# Patient Record
Sex: FEMALE | Race: WHITE | NOT HISPANIC OR LATINO | URBAN - METROPOLITAN AREA
[De-identification: names, ages, dates, MRNs, and addresses within clinical notes are randomized per-mention and may not be internally consistent; named-entity substitution may affect disease eponyms.]

---

## 2022-04-04 ENCOUNTER — OFFICE VISIT (OUTPATIENT)
Dept: URGENT CARE | Facility: CLINIC | Age: 22
End: 2022-04-04
Payer: COMMERCIAL

## 2022-04-04 VITALS
TEMPERATURE: 98 F | HEART RATE: 106 BPM | WEIGHT: 133.2 LBS | DIASTOLIC BLOOD PRESSURE: 70 MMHG | HEIGHT: 66 IN | SYSTOLIC BLOOD PRESSURE: 114 MMHG | OXYGEN SATURATION: 98 % | BODY MASS INDEX: 21.41 KG/M2 | RESPIRATION RATE: 16 BRPM

## 2022-04-04 DIAGNOSIS — L60.0 INGROWING RIGHT GREAT TOENAIL: Primary | ICD-10-CM

## 2022-04-04 DIAGNOSIS — Z23 NEED FOR TETANUS, DIPHTHERIA, AND ACELLULAR PERTUSSIS (TDAP) VACCINE: ICD-10-CM

## 2022-04-04 PROCEDURE — G0382 LEV 3 HOSP TYPE B ED VISIT: HCPCS | Performed by: EMERGENCY MEDICINE

## 2022-04-04 PROCEDURE — 90715 TDAP VACCINE 7 YRS/> IM: CPT

## 2022-04-04 PROCEDURE — 11730 AVULSION NAIL PLATE SIMPLE 1: CPT | Performed by: EMERGENCY MEDICINE

## 2022-04-04 PROCEDURE — 90471 IMMUNIZATION ADMIN: CPT | Performed by: EMERGENCY MEDICINE

## 2022-04-04 RX ORDER — SERTRALINE HYDROCHLORIDE 100 MG/1
100 TABLET, FILM COATED ORAL DAILY
COMMUNITY

## 2022-04-04 RX ORDER — CEPHALEXIN 500 MG/1
500 CAPSULE ORAL EVERY 8 HOURS SCHEDULED
Qty: 15 CAPSULE | Refills: 0 | Status: SHIPPED | OUTPATIENT
Start: 2022-04-04 | End: 2022-04-09

## 2022-04-04 RX ORDER — NORETHINDRONE ACETATE AND ETHINYL ESTRADIOL 1; 20 MG/1; UG/1
1 TABLET ORAL DAILY
COMMUNITY
Start: 2022-03-16

## 2022-04-04 NOTE — PATIENT INSTRUCTIONS
Ingrown Nail   WHAT YOU NEED TO KNOW:   An ingrown nail is when the edge of your fingernail or toenail grows into the skin next to it  The most common cause is when nails are trimmed too short  DISCHARGE INSTRUCTIONS:   Return to the emergency department if:   · You have a red streak running up your leg or arm  Contact your healthcare provider if:   · Your pain is getting worse  · Your nail and skin are more swollen or start to drain pus  · You have a fever or chills  · Your ingrown nail is not better in 7 days  · You have questions or concerns about your condition or care  Medicines:   · Acetaminophen  decreases pain and can be bought without a doctor's order  Ask how much to take and how often to take it  Follow directions  Acetaminophen can cause liver damage if not taken correctly  · NSAIDs , such as ibuprofen, help decrease swelling, pain, and fever  This medicine is available with or without a doctor's order  NSAIDs can cause stomach bleeding or kidney problems in certain people  If you take blood thinner medicine, always ask your healthcare provider if NSAIDs are safe for you  Always read the medicine label and follow directions  · Antibiotics  help treat or prevent a bacterial infection  They may be given as an ointment, pill, or both  · Take your medicine as directed  Contact your healthcare provider if you think your medicine is not helping or if you have side effects  Tell him or her if you are allergic to any medicine  Keep a list of the medicines, vitamins, and herbs you take  Include the amounts, and when and why you take them  Bring the list or the pill bottles to follow-up visits  Carry your medicine list with you in case of an emergency  Self-care:   · Soak and lift the nail  Soak your ingrown nail in warm water for 20 minutes, 2 to 3 times each day  Then gently lift the edge of the ingrown nail away from the skin   Wedge a small piece of cotton or gauze under the corner of the nail  You can also put dental floss under the nail to lift the edge away from the skin  This may help keep the nail from growing into the skin  · Keep your nails clean and dry  Wash your hands and feet with soap and water  Pat dry with a clean towel  Dry in between each toe  Do not put lotion between your toes  Prevent another ingrown nail:   · Carefully trim your nails  Cut your nails straight across  Do not cut them too short  Lightly file the nail corners if you have sharp edges  Do not round your nails  Do not rip or tear off the tips of your nails  This may cause your nail edge to grow into the skin  Use clippers, not nail scissors  · Wear shoes and socks that fit well  Make sure they are not too tight  You may need to wear a shoe with the toe cut out, such as sandals, until your ingrown toenail heals  Do not wear shoes that have pointed toes or heels that are more than 2 inches high  Do not wear tight hose or socks  Wear socks that pull moisture away from your feet, such as cotton-acrylic blends  · Inspect your nails daily  Look for signs of an ingrown nail  Manage problems early so the nail does not become infected  Follow up with your healthcare provider as directed: You may be referred to a podiatrist  Write down your questions so you remember to ask them during your visits  © Copyright [employer] 2022 Information is for End User's use only and may not be sold, redistributed or otherwise used for commercial purposes  All illustrations and images included in CareNotes® are the copyrighted property of [employer]  or Toi Nicholson  The above information is an  only  It is not intended as medical advice for individual conditions or treatments  Talk to your doctor, nurse or pharmacist before following any medical regimen to see if it is safe and effective for you

## 2022-04-04 NOTE — PROGRESS NOTES
[facility]        NAME: Soledad Ramirez is a 24 y o  female  : 2000    MRN: [id number]  DATE: 2022  TIME: 12:12 PM    Assessment and Plan   Ingrowing right great toenail [L60 0]  1  Ingrowing right great toenail       Nail removal    Date/Time: 2022 12:13 PM  Performed by: Makenna Smith MD  Authorized by: Makenna Smith MD     Patient location:  BedsideUniversal Protocol:  Consent: Verbal consent obtained  Risks and benefits: risks, benefits and alternatives were discussed  Consent given by: patient  Patient understanding: patient states understanding of the procedure being performed  Patient consent: the patient's understanding of the procedure matches consent given  Procedure consent: procedure consent matches procedure scheduled  Relevant documents: relevant documents present and verified      Location:     Foot:  R big toe  Pre-procedure details:     Skin preparation:  Alcohol  Anesthesia (see MAR for exact dosages): Anesthesia method:  Nerve block    Block needle gauge:  27 G    Block anesthetic:  Lidocaine 1% WITH epi    Block technique:  Digital block    Block injection procedure:  Anatomic landmarks identified, introduced needle, negative aspiration for blood and incremental injection    Block outcome:  Anesthesia achieved  Nail Removal:     Nail removed:  1/3    Nail bed sutured: no      Removed nail replaced and anchored: no    Trephination:     Subungual hematoma drained: no    Ingrown nail:     Wedge excision of skin: no      Nail matrix removed or ablated:  None  Post-procedure details:     Patient tolerance of procedure: Tolerated well, no immediate complications  Comments:      Silver nitrate applied for hemostasis  Patient Instructions     Patient Instructions     Ingrown Nail   WHAT YOU NEED TO KNOW:   An ingrown nail is when the edge of your fingernail or toenail grows into the skin next to it   The most common cause is when nails are trimmed too short        DISCHARGE INSTRUCTIONS:   Return to the emergency department if:   · You have a red streak running up your leg or arm  Contact your healthcare provider if:   · Your pain is getting worse  · Your nail and skin are more swollen or start to drain pus  · You have a fever or chills  · Your ingrown nail is not better in 7 days  · You have questions or concerns about your condition or care  Medicines:   · Acetaminophen  decreases pain and can be bought without a doctor's order  Ask how much to take and how often to take it  Follow directions  Acetaminophen can cause liver damage if not taken correctly  · NSAIDs , such as ibuprofen, help decrease swelling, pain, and fever  This medicine is available with or without a doctor's order  NSAIDs can cause stomach bleeding or kidney problems in certain people  If you take blood thinner medicine, always ask your healthcare provider if NSAIDs are safe for you  Always read the medicine label and follow directions  · Antibiotics  help treat or prevent a bacterial infection  They may be given as an ointment, pill, or both  · Take your medicine as directed  Contact your healthcare provider if you think your medicine is not helping or if you have side effects  Tell him or her if you are allergic to any medicine  Keep a list of the medicines, vitamins, and herbs you take  Include the amounts, and when and why you take them  Bring the list or the pill bottles to follow-up visits  Carry your medicine list with you in case of an emergency  Self-care:   · Soak and lift the nail  Soak your ingrown nail in warm water for 20 minutes, 2 to 3 times each day  Then gently lift the edge of the ingrown nail away from the skin  Wedge a small piece of cotton or gauze under the corner of the nail  You can also put dental floss under the nail to lift the edge away from the skin  This may help keep the nail from growing into the skin       · Keep your nails clean and dry  Wash your hands and feet with soap and water  Pat dry with a clean towel  Dry in between each toe  Do not put lotion between your toes  Prevent another ingrown nail:   · Carefully trim your nails  Cut your nails straight across  Do not cut them too short  Lightly file the nail corners if you have sharp edges  Do not round your nails  Do not rip or tear off the tips of your nails  This may cause your nail edge to grow into the skin  Use clippers, not nail scissors  · Wear shoes and socks that fit well  Make sure they are not too tight  You may need to wear a shoe with the toe cut out, such as sandals, until your ingrown toenail heals  Do not wear shoes that have pointed toes or heels that are more than 2 inches high  Do not wear tight hose or socks  Wear socks that pull moisture away from your feet, such as cotton-acrylic blends  · Inspect your nails daily  Look for signs of an ingrown nail  Manage problems early so the nail does not become infected  Follow up with your healthcare provider as directed: You may be referred to a podiatrist  Write down your questions so you remember to ask them during your visits  © Copyright [address] 2022 Information is for End User's use only and may not be sold, redistributed or otherwise used for commercial purposes  All illustrations and images included in CareNotes® are the copyrighted property of [employer] Inc  or [facility] Jaclyn Jackson   The above information is an  only  It is not intended as medical advice for individual conditions or treatments  Talk to your doctor, nurse or pharmacist before following any medical regimen to see if it is safe and effective for you  Follow up with PCP in 3-5 days  Proceed to  ER if symptoms worsen      Chief Complaint     Chief Complaint   Patient presents with    Ingrown Toenail     ingrown toenail right great toe         History of Present Illness       Patient complains of redness, swelling, pain associated with nail margin right great toe for the past 1 week      Review of Systems   Review of Systems   Constitutional: Negative for activity change  Musculoskeletal: Negative for arthralgias, joint swelling, myalgias, neck pain and neck stiffness  Skin: Positive for color change and wound  Neurological: Negative for weakness  Current Medications       Current Outpatient Medications:     Junel 1/20 1-20 MG-MCG per tablet, Take 1 tablet by mouth daily, Disp: , Rfl:     sertraline (ZOLOFT) 100 mg tablet, Take 100 mg by mouth daily, Disp: , Rfl:     sertraline (ZOLOFT) 50 mg tablet, Take 50 mg by mouth daily, Disp: , Rfl:     Current Allergies     Allergies as of 04/04/2022    (No Known Allergies)            The following portions of the patient's history were reviewed and updated as appropriate: allergies, current medications, past family history, past medical history, past social history, past surgical history and problem list      Past Medical History:   Diagnosis Date    Anxiety     Blind     Stickler syndrome        Past Surgical History:   Procedure Laterality Date    CLEFT PALATE REPAIR      TYMPANOPLASTY         Family History   Problem Relation Age of Onset    No Known Problems Mother     COPD Father          Medications have been verified  Objective   /70   Pulse (!) 106   Temp 98 °F (36 7 °C)   Resp 16   Ht 5' 6" (1 676 m)   Wt 60 4 kg (133 lb 3 2 oz)   SpO2 98%   BMI 21 50 kg/m²        Physical Exam     Physical Exam  Vitals and nursing note reviewed  Constitutional:       Appearance: She is well-developed  HENT:      Head: Normocephalic and atraumatic  Musculoskeletal:         General: No tenderness  Cervical back: Normal range of motion and neck supple  Skin:     General: Skin is warm and dry  Findings: Erythema present  No rash        Comments: Erythema and tenderness medial nail margin right great toenail Neurological:      Mental Status: She is alert and oriented to person, place, and time  Psychiatric:         Behavior: Behavior normal          Thought Content:  Thought content normal          Judgment: Judgment normal

## 2022-04-06 ENCOUNTER — OFFICE VISIT (OUTPATIENT)
Dept: URGENT CARE | Facility: CLINIC | Age: 22
End: 2022-04-06
Payer: COMMERCIAL

## 2022-04-06 VITALS
BODY MASS INDEX: 21.42 KG/M2 | SYSTOLIC BLOOD PRESSURE: 104 MMHG | RESPIRATION RATE: 16 BRPM | WEIGHT: 133.3 LBS | HEIGHT: 66 IN | DIASTOLIC BLOOD PRESSURE: 66 MMHG | HEART RATE: 78 BPM | TEMPERATURE: 97.2 F | OXYGEN SATURATION: 97 %

## 2022-04-06 DIAGNOSIS — M79.674 GREAT TOE PAIN, RIGHT: Primary | ICD-10-CM

## 2022-04-06 PROCEDURE — G0382 LEV 3 HOSP TYPE B ED VISIT: HCPCS

## 2022-04-06 RX ORDER — IBUPROFEN 200 MG
400 TABLET ORAL EVERY 6 HOURS PRN
COMMUNITY

## 2022-04-06 RX ORDER — KETOROLAC TROMETHAMINE 10 MG/1
10 TABLET, FILM COATED ORAL EVERY 6 HOURS PRN
Qty: 30 TABLET | Refills: 0 | Status: SHIPPED | OUTPATIENT
Start: 2022-04-06

## 2022-04-06 NOTE — PROGRESS NOTES
[facility]        NAME: Jinger Phoenix is a 24 y o  female  : 2000    MRN: [id number]  DATE: 2022  TIME: 12:00 PM    Assessment and Plan   Great toe pain, right [M79 674]  1  Great toe pain, right  ketorolac (TORADOL) 10 mg tablet         Patient Instructions     Take 1 tablet of ketorolac 3 times a day as needed for right great toe pain  When taking his medication make sure you take it with food  Follow-up with her family doctor for further evaluation and work restriction direction  Follow up with PCP in 3-5 days  Proceed to  ER if symptoms worsen  Chief Complaint     Chief Complaint   Patient presents with    Toe Pain     c/o continued pain in right great toe where had ingrown toe nail removed on  "feel pressure"         History of Present Illness     Jinger Phoenix is a 24 y o  female who presents for complaint of continued right great toe pain after having an ingrown toenail removed 2 days ago  Patient states that the toenail was moved 2 days ago in this exact office by Dr Cassandra Duron in and complains of continued pain, but denies paresthesias, numbness, drainage  Patient reports decreased range of motion due to pain  She has been taking Motrin resulting in no pain relief  Review of Systems   Review of Systems   Constitutional: Negative for chills, fatigue and fever  Respiratory: Negative for cough and shortness of breath  Cardiovascular: Negative for chest pain and palpitations  Gastrointestinal: Negative for abdominal pain, constipation, diarrhea, nausea and vomiting  Skin: Positive for color change and wound  Negative for pallor and rash  Neurological: Negative for headaches  All other systems reviewed and are negative          Current Medications       Current Outpatient Medications:     cephalexin (KEFLEX) 500 mg capsule, Take 1 capsule (500 mg total) by mouth every 8 (eight) hours for 5 days, Disp: 15 capsule, Rfl: 0    ibuprofen (MOTRIN) 200 mg tablet, Take 400 mg by mouth every 6 (six) hours as needed for mild pain, Disp: , Rfl:     Junel 1/20 1-20 MG-MCG per tablet, Take 1 tablet by mouth daily, Disp: , Rfl:     sertraline (ZOLOFT) 100 mg tablet, Take 100 mg by mouth daily, Disp: , Rfl:     sertraline (ZOLOFT) 50 mg tablet, Take 50 mg by mouth daily, Disp: , Rfl:     ketorolac (TORADOL) 10 mg tablet, Take 1 tablet (10 mg total) by mouth every 6 (six) hours as needed for moderate pain, Disp: 30 tablet, Rfl: 0    Current Allergies     Allergies as of 04/06/2022    (No Known Allergies)            The following portions of the patient's history were reviewed and updated as appropriate: allergies, current medications, past family history, past medical history, past social history, past surgical history and problem list      Past Medical History:   Diagnosis Date    Anxiety     Blind     Stickler syndrome        Past Surgical History:   Procedure Laterality Date    CLEFT PALATE REPAIR      TYMPANOPLASTY         Family History   Problem Relation Age of Onset    No Known Problems Mother     COPD Father          Medications have been verified  Objective   /66   Pulse 78   Temp (!) 97 2 °F (36 2 °C)   Resp 16   Ht 5' 6" (1 676 m)   Wt 60 5 kg (133 lb 4 8 oz)   SpO2 97%   BMI 21 52 kg/m²   No LMP recorded  Physical Exam     Physical Exam  Vitals and nursing note reviewed  Constitutional:       General: She is awake  She is not in acute distress  Appearance: Normal appearance  She is well-developed, well-groomed and normal weight  She is not ill-appearing  Cardiovascular:      Rate and Rhythm: Normal rate and regular rhythm  Heart sounds: Normal heart sounds  Pulmonary:      Effort: Pulmonary effort is normal       Breath sounds: Normal breath sounds  No wheezing, rhonchi or rales  Musculoskeletal:      Right foot: Decreased range of motion  Normal capillary refill  Tenderness present   No swelling, laceration or bony tenderness  Normal pulse  Left foot: Normal range of motion and normal capillary refill  No swelling, laceration or bony tenderness  Normal pulse  Feet:    Feet:      Right foot:      Skin integrity: Warmth present  No erythema  Left foot:      Skin integrity: No erythema or warmth  Comments: Right foot great toe with hyperemia from the ITP joint distally, that is tender to palpation without induration or discharge  Skin:     General: Skin is warm  Capillary Refill: Capillary refill takes less than 2 seconds  Findings: Wound (Right foot great toe where ingrown toenail removal was performed leaving plaque and cauterized tissue ) present  Neurological:      Mental Status: She is alert  Psychiatric:         Behavior: Behavior is cooperative

## 2022-04-06 NOTE — PATIENT INSTRUCTIONS
Take 1 tablet of ketorolac 3 times a day as needed for right great toe pain  When taking his medication make sure you take it with food  Follow-up with her family doctor for further evaluation and work restriction direction

## 2022-04-06 NOTE — LETTER
April 6, 2022     Patient: Maricruz Keith   YOB: 2000   Date of Visit: 4/6/2022       To Whom It May Concern: It is my medical opinion that Maricruz Keith may return to light duty immediately with the following restrictions: not having to wear her steal toe boots as her foot can not fit at present until 4/14/2022       If you have any questions or concerns, please don't hesitate to call           Sincerely,        Manny Leung PA-C    CC: No Recipients

## 2023-06-29 ENCOUNTER — OFFICE VISIT (OUTPATIENT)
Dept: URGENT CARE | Facility: CLINIC | Age: 23
End: 2023-06-29
Payer: COMMERCIAL

## 2023-06-29 ENCOUNTER — APPOINTMENT (OUTPATIENT)
Dept: RADIOLOGY | Facility: CLINIC | Age: 23
End: 2023-06-29
Payer: COMMERCIAL

## 2023-06-29 VITALS
HEIGHT: 66 IN | OXYGEN SATURATION: 98 % | RESPIRATION RATE: 16 BRPM | BODY MASS INDEX: 21.21 KG/M2 | WEIGHT: 132 LBS | SYSTOLIC BLOOD PRESSURE: 116 MMHG | TEMPERATURE: 97.8 F | HEART RATE: 62 BPM | DIASTOLIC BLOOD PRESSURE: 70 MMHG

## 2023-06-29 DIAGNOSIS — S93.506A SPRAIN OF FIFTH TOE: Primary | ICD-10-CM

## 2023-06-29 DIAGNOSIS — M79.675 PAIN OF TOE OF LEFT FOOT: ICD-10-CM

## 2023-06-29 PROCEDURE — G0382 LEV 3 HOSP TYPE B ED VISIT: HCPCS | Performed by: PHYSICIAN ASSISTANT

## 2023-06-29 PROCEDURE — 73660 X-RAY EXAM OF TOE(S): CPT

## 2023-06-29 NOTE — PROGRESS NOTES
[facility]        NAME: Collin Abdalla is a 25 y o  female  : 2000    MRN: [id number]  DATE: 2023  TIME: 5:48 PM    Assessment and Plan   Sprain of fifth toe [S93 506A]  1  Sprain of fifth toe  XR toe left fifth min 2 views            Patient Instructions   Buddy tape  Tylenol or Profen  Elevate  Follow up with PCP in 3-5 days  Proceed to  ER if symptoms worsen  Chief Complaint     Chief Complaint   Patient presents with   • Toe Pain     Hit left pinky toe on air conditioner 2 days ago  Swollen, red and painful  History of Present Illness       Patient is 70-year-old female with no significant past medical history presents the office planing of left fifth digit pain after stubbing her toe on the air conditioner 2 days ago  Reports area is red, swollen, painful, and bruised  She has not taken anything for pain  Review of Systems   Review of Systems   Musculoskeletal: Positive for arthralgias and joint swelling           Current Medications       Current Outpatient Medications:   •  ibuprofen (MOTRIN) 200 mg tablet, Take 400 mg by mouth every 6 (six) hours as needed for mild pain, Disp: , Rfl:   •  sertraline (ZOLOFT) 100 mg tablet, Take 100 mg by mouth daily, Disp: , Rfl:   •  Junel 1/20 1-20 MG-MCG per tablet, Take 1 tablet by mouth daily (Patient not taking: Reported on 2023), Disp: , Rfl:   •  ketorolac (TORADOL) 10 mg tablet, Take 1 tablet (10 mg total) by mouth every 6 (six) hours as needed for moderate pain (Patient not taking: Reported on 2023), Disp: 30 tablet, Rfl: 0  •  sertraline (ZOLOFT) 50 mg tablet, Take 50 mg by mouth daily (Patient not taking: Reported on 2023), Disp: , Rfl:     Current Allergies     Allergies as of 2023   • (No Known Allergies)            The following portions of the patient's history were reviewed and updated as appropriate: allergies, current medications, past family history, past medical history, past "social history, past surgical history and problem list      Past Medical History:   Diagnosis Date   • Anxiety    • Blind    • Stickler syndrome        Past Surgical History:   Procedure Laterality Date   • CLEFT PALATE REPAIR     • TYMPANOPLASTY         Family History   Problem Relation Age of Onset   • No Known Problems Mother    • COPD Father          Medications have been verified  Objective   /70   Pulse 62   Temp 97 8 °F (36 6 °C)   Resp 16   Ht 5' 6\" (1 676 m)   Wt 59 9 kg (132 lb)   LMP 05/19/2023 Comment: states she did a pregnancy test 4 days ago  SpO2 98%   BMI 21 31 kg/m²   Patient's last menstrual period was 05/19/2023  Physical Exam     Physical Exam  Vitals and nursing note reviewed  Constitutional:       Appearance: She is well-developed  HENT:      Head: Normocephalic and atraumatic  Right Ear: External ear normal       Left Ear: External ear normal       Nose: Nose normal    Eyes:      General: Lids are normal       Conjunctiva/sclera: Conjunctivae normal    Musculoskeletal:      Comments: Swelling and ecchymosis to the left fifth MTP joint  TTP to same  Skin:     General: Skin is warm and dry  Capillary Refill: Capillary refill takes less than 2 seconds  Neurological:      Mental Status: She is alert  Left 5th toe XR: No acute osseous abnormalities  Fused intermediate and distal phalanx of 5th digit  Radiology interpretation pending              "

## 2023-06-29 NOTE — LETTER
June 29, 2023     Patient: Trudi Haider   YOB: 2000   Date of Visit: 6/29/2023       To Whom It May Concern: It is my medical opinion that Trudi Haider may return to work on 7/2/2023            Sincerely,        Amber Hampton PA-C